# Patient Record
Sex: MALE | Race: OTHER | HISPANIC OR LATINO | URBAN - METROPOLITAN AREA
[De-identification: names, ages, dates, MRNs, and addresses within clinical notes are randomized per-mention and may not be internally consistent; named-entity substitution may affect disease eponyms.]

---

## 2017-02-12 ENCOUNTER — INPATIENT (INPATIENT)
Facility: HOSPITAL | Age: 37
LOS: 2 days | Discharge: HOME | DRG: 418 | End: 2017-02-15
Attending: SURGERY | Admitting: SURGERY
Payer: COMMERCIAL

## 2017-02-12 ENCOUNTER — RESULT REVIEW (OUTPATIENT)
Age: 37
End: 2017-02-12

## 2017-02-12 VITALS
HEART RATE: 69 BPM | TEMPERATURE: 98 F | WEIGHT: 169.98 LBS | SYSTOLIC BLOOD PRESSURE: 132 MMHG | HEIGHT: 67 IN | RESPIRATION RATE: 20 BRPM | DIASTOLIC BLOOD PRESSURE: 87 MMHG | OXYGEN SATURATION: 100 %

## 2017-02-12 DIAGNOSIS — Z90.49 ACQUIRED ABSENCE OF OTHER SPECIFIED PARTS OF DIGESTIVE TRACT: Chronic | ICD-10-CM

## 2017-02-12 LAB
ALBUMIN SERPL ELPH-MCNC: 4.1 G/DL — SIGNIFICANT CHANGE UP (ref 3.3–5.2)
ALP SERPL-CCNC: 78 U/L — SIGNIFICANT CHANGE UP (ref 40–120)
ALT FLD-CCNC: 19 U/L — SIGNIFICANT CHANGE UP
ANION GAP SERPL CALC-SCNC: 11 MMOL/L — SIGNIFICANT CHANGE UP (ref 5–17)
AST SERPL-CCNC: 44 U/L — HIGH
BASOPHILS # BLD AUTO: 0 K/UL — SIGNIFICANT CHANGE UP (ref 0–0.2)
BASOPHILS NFR BLD AUTO: 0.2 % — SIGNIFICANT CHANGE UP (ref 0–2)
BILIRUB SERPL-MCNC: 0.6 MG/DL — SIGNIFICANT CHANGE UP (ref 0.4–2)
BUN SERPL-MCNC: 8 MG/DL — SIGNIFICANT CHANGE UP (ref 8–20)
CALCIUM SERPL-MCNC: 9.3 MG/DL — SIGNIFICANT CHANGE UP (ref 8.6–10.2)
CHLORIDE SERPL-SCNC: 99 MMOL/L — SIGNIFICANT CHANGE UP (ref 98–107)
CO2 SERPL-SCNC: 23 MMOL/L — SIGNIFICANT CHANGE UP (ref 22–29)
CREAT SERPL-MCNC: 0.69 MG/DL — SIGNIFICANT CHANGE UP (ref 0.5–1.3)
EOSINOPHIL # BLD AUTO: 0.1 K/UL — SIGNIFICANT CHANGE UP (ref 0–0.5)
EOSINOPHIL NFR BLD AUTO: 1.2 % — SIGNIFICANT CHANGE UP (ref 0–6)
GLUCOSE SERPL-MCNC: 110 MG/DL — SIGNIFICANT CHANGE UP (ref 70–115)
HCT VFR BLD CALC: 40.5 % — LOW (ref 42–52)
HGB BLD-MCNC: 14.1 G/DL — SIGNIFICANT CHANGE UP (ref 14–18)
LIDOCAIN IGE QN: 20 U/L — LOW (ref 22–51)
LYMPHOCYTES # BLD AUTO: 1.7 K/UL — SIGNIFICANT CHANGE UP (ref 1–4.8)
LYMPHOCYTES # BLD AUTO: 18.5 % — LOW (ref 20–55)
MCHC RBC-ENTMCNC: 31.3 PG — HIGH (ref 27–31)
MCHC RBC-ENTMCNC: 34.8 G/DL — SIGNIFICANT CHANGE UP (ref 32–36)
MCV RBC AUTO: 90 FL — SIGNIFICANT CHANGE UP (ref 80–94)
MONOCYTES # BLD AUTO: 0.5 K/UL — SIGNIFICANT CHANGE UP (ref 0–0.8)
MONOCYTES NFR BLD AUTO: 5.6 % — SIGNIFICANT CHANGE UP (ref 3–10)
NEUTROPHILS # BLD AUTO: 6.7 K/UL — SIGNIFICANT CHANGE UP (ref 1.8–8)
NEUTROPHILS NFR BLD AUTO: 74.3 % — HIGH (ref 37–73)
PLATELET # BLD AUTO: 260 K/UL — SIGNIFICANT CHANGE UP (ref 150–400)
POTASSIUM SERPL-MCNC: 5.1 MMOL/L — SIGNIFICANT CHANGE UP (ref 3.5–5.3)
POTASSIUM SERPL-SCNC: 5.1 MMOL/L — SIGNIFICANT CHANGE UP (ref 3.5–5.3)
PROT SERPL-MCNC: 8.1 G/DL — SIGNIFICANT CHANGE UP (ref 6.6–8.7)
RBC # BLD: 4.5 M/UL — LOW (ref 4.6–6.2)
RBC # FLD: 11.7 % — SIGNIFICANT CHANGE UP (ref 11–15.6)
SODIUM SERPL-SCNC: 133 MMOL/L — LOW (ref 135–145)
WBC # BLD: 9.08 K/UL — SIGNIFICANT CHANGE UP (ref 4.8–10.8)
WBC # FLD AUTO: 9.08 K/UL — SIGNIFICANT CHANGE UP (ref 4.8–10.8)

## 2017-02-12 PROCEDURE — 93010 ELECTROCARDIOGRAM REPORT: CPT

## 2017-02-12 PROCEDURE — 76705 ECHO EXAM OF ABDOMEN: CPT | Mod: 26

## 2017-02-12 RX ORDER — HYDROMORPHONE HYDROCHLORIDE 2 MG/ML
0.5 INJECTION INTRAMUSCULAR; INTRAVENOUS; SUBCUTANEOUS ONCE
Qty: 0 | Refills: 0 | Status: DISCONTINUED | OUTPATIENT
Start: 2017-02-12 | End: 2017-02-12

## 2017-02-12 RX ORDER — SODIUM CHLORIDE 9 MG/ML
1000 INJECTION INTRAMUSCULAR; INTRAVENOUS; SUBCUTANEOUS ONCE
Qty: 0 | Refills: 0 | Status: COMPLETED | OUTPATIENT
Start: 2017-02-12 | End: 2017-02-12

## 2017-02-12 RX ADMIN — HYDROMORPHONE HYDROCHLORIDE 0.5 MILLIGRAM(S): 2 INJECTION INTRAMUSCULAR; INTRAVENOUS; SUBCUTANEOUS at 23:06

## 2017-02-12 RX ADMIN — HYDROMORPHONE HYDROCHLORIDE 0.5 MILLIGRAM(S): 2 INJECTION INTRAMUSCULAR; INTRAVENOUS; SUBCUTANEOUS at 23:16

## 2017-02-12 RX ADMIN — SODIUM CHLORIDE 1000 MILLILITER(S): 9 INJECTION INTRAMUSCULAR; INTRAVENOUS; SUBCUTANEOUS at 21:59

## 2017-02-12 NOTE — ED STATDOCS - MEDICAL DECISION MAKING DETAILS
35 y/o M with 3 day hx of EtOH use, with a day of abdominal pain radiating to the back. Will do labs and CT to rule out pancreatitis.

## 2017-02-12 NOTE — ED ADULT NURSE NOTE - OBJECTIVE STATEMENT
pt received in [facility] track. pt is awake alert oriented following commands and speaking coherently. pt presents to the er complaining of epigastric abdominal pain that radiates to the back since this morning. pt states he was nauseous before but has since resolved. abdomen is soft and non tender. denies nausea vomiting fever chills chest pain sob headache diarrhea constipation and urinary problems. in no apparent distress

## 2017-02-12 NOTE — ED STATDOCS - PROGRESS NOTE DETAILS
Pt appears no toxic, no distress and sitting comfortably with family at bedside. Pt stevie hx of gallstones and states this is the first time he has had this type of abdominal discomfort. Pt drinking IV contrast and awaiting CT scan. Pt with acute kenneth on US and surgery consulted and will come eval patient for admission

## 2017-02-12 NOTE — ED STATDOCS - NS ED MD SCRIBE ATTENDING SCRIBE SECTIONS
PHYSICAL EXAM/PAST MEDICAL/SURGICAL/SOCIAL HISTORY/HIV/HISTORY OF PRESENT ILLNESS/VITAL SIGNS( Pullset)/REVIEW OF SYSTEMS/DISPOSITION

## 2017-02-12 NOTE — ED STATDOCS - ATTENDING CONTRIBUTION TO CARE
I, Nghia Shipley, performed the initial face to face bedside interview with this patient regarding history of present illness, review of symptoms and relevant past medical, social and family history.  I completed an independent physical examination.  I was the initial provider who evaluated this patient. I have signed out the follow up of any pending tests (i.e. labs, radiological studies) to the ACP.  I have communicated the patient’s plan of care and disposition with the ACP.  The history, relevant review of systems, past medical and surgical history, medical decision making, and physical examination was documented by the scribe in my presence and I attest to the accuracy of the documentation.

## 2017-02-13 ENCOUNTER — TRANSCRIPTION ENCOUNTER (OUTPATIENT)
Age: 37
End: 2017-02-13

## 2017-02-13 DIAGNOSIS — K81.0 ACUTE CHOLECYSTITIS: ICD-10-CM

## 2017-02-13 PROCEDURE — 88304 TISSUE EXAM BY PATHOLOGIST: CPT | Mod: 26

## 2017-02-13 PROCEDURE — 99284 EMERGENCY DEPT VISIT MOD MDM: CPT

## 2017-02-13 RX ORDER — CEFOTETAN DISODIUM 1 G
2 VIAL (EA) INJECTION ONCE
Qty: 0 | Refills: 0 | Status: COMPLETED | OUTPATIENT
Start: 2017-02-13 | End: 2017-02-13

## 2017-02-13 RX ORDER — MORPHINE SULFATE 50 MG/1
4 CAPSULE, EXTENDED RELEASE ORAL EVERY 4 HOURS
Qty: 0 | Refills: 0 | Status: DISCONTINUED | OUTPATIENT
Start: 2017-02-13 | End: 2017-02-15

## 2017-02-13 RX ORDER — ONDANSETRON 8 MG/1
4 TABLET, FILM COATED ORAL ONCE
Qty: 0 | Refills: 0 | Status: COMPLETED | OUTPATIENT
Start: 2017-02-13 | End: 2017-02-13

## 2017-02-13 RX ORDER — BENZOCAINE AND MENTHOL 5; 1 G/100ML; G/100ML
1 LIQUID ORAL
Qty: 0 | Refills: 0 | Status: DISCONTINUED | OUTPATIENT
Start: 2017-02-13 | End: 2017-02-15

## 2017-02-13 RX ORDER — POTASSIUM CHLORIDE 20 MEQ
40 PACKET (EA) ORAL ONCE
Qty: 0 | Refills: 0 | Status: COMPLETED | OUTPATIENT
Start: 2017-02-13 | End: 2017-02-13

## 2017-02-13 RX ORDER — PANTOPRAZOLE SODIUM 20 MG/1
40 TABLET, DELAYED RELEASE ORAL DAILY
Qty: 0 | Refills: 0 | Status: DISCONTINUED | OUTPATIENT
Start: 2017-02-13 | End: 2017-02-13

## 2017-02-13 RX ORDER — ENOXAPARIN SODIUM 100 MG/ML
40 INJECTION SUBCUTANEOUS EVERY 24 HOURS
Qty: 0 | Refills: 0 | Status: DISCONTINUED | OUTPATIENT
Start: 2017-02-13 | End: 2017-02-13

## 2017-02-13 RX ORDER — ONDANSETRON 8 MG/1
4 TABLET, FILM COATED ORAL EVERY 8 HOURS
Qty: 0 | Refills: 0 | Status: DISCONTINUED | OUTPATIENT
Start: 2017-02-13 | End: 2017-02-15

## 2017-02-13 RX ORDER — FENTANYL CITRATE 50 UG/ML
50 INJECTION INTRAVENOUS
Qty: 0 | Refills: 0 | Status: DISCONTINUED | OUTPATIENT
Start: 2017-02-13 | End: 2017-02-13

## 2017-02-13 RX ORDER — ENOXAPARIN SODIUM 100 MG/ML
40 INJECTION SUBCUTANEOUS EVERY 24 HOURS
Qty: 0 | Refills: 0 | Status: DISCONTINUED | OUTPATIENT
Start: 2017-02-13 | End: 2017-02-15

## 2017-02-13 RX ORDER — SODIUM CHLORIDE 9 MG/ML
1000 INJECTION, SOLUTION INTRAVENOUS
Qty: 0 | Refills: 0 | Status: DISCONTINUED | OUTPATIENT
Start: 2017-02-13 | End: 2017-02-13

## 2017-02-13 RX ORDER — CEFOTETAN DISODIUM 1 G
2 VIAL (EA) INJECTION EVERY 12 HOURS
Qty: 0 | Refills: 0 | Status: COMPLETED | OUTPATIENT
Start: 2017-02-13 | End: 2017-02-14

## 2017-02-13 RX ORDER — MORPHINE SULFATE 50 MG/1
4 CAPSULE, EXTENDED RELEASE ORAL EVERY 4 HOURS
Qty: 0 | Refills: 0 | Status: DISCONTINUED | OUTPATIENT
Start: 2017-02-13 | End: 2017-02-13

## 2017-02-13 RX ORDER — PANTOPRAZOLE SODIUM 20 MG/1
40 TABLET, DELAYED RELEASE ORAL DAILY
Qty: 0 | Refills: 0 | Status: DISCONTINUED | OUTPATIENT
Start: 2017-02-13 | End: 2017-02-15

## 2017-02-13 RX ORDER — SODIUM CHLORIDE 9 MG/ML
1000 INJECTION, SOLUTION INTRAVENOUS
Qty: 0 | Refills: 0 | Status: DISCONTINUED | OUTPATIENT
Start: 2017-02-13 | End: 2017-02-14

## 2017-02-13 RX ORDER — ONDANSETRON 8 MG/1
4 TABLET, FILM COATED ORAL EVERY 6 HOURS
Qty: 0 | Refills: 0 | Status: DISCONTINUED | OUTPATIENT
Start: 2017-02-13 | End: 2017-02-13

## 2017-02-13 RX ADMIN — MORPHINE SULFATE 4 MILLIGRAM(S): 50 CAPSULE, EXTENDED RELEASE ORAL at 15:35

## 2017-02-13 RX ADMIN — MORPHINE SULFATE 4 MILLIGRAM(S): 50 CAPSULE, EXTENDED RELEASE ORAL at 01:56

## 2017-02-13 RX ADMIN — FENTANYL CITRATE 50 MICROGRAM(S): 50 INJECTION INTRAVENOUS at 13:48

## 2017-02-13 RX ADMIN — PANTOPRAZOLE SODIUM 40 MILLIGRAM(S): 20 TABLET, DELAYED RELEASE ORAL at 01:41

## 2017-02-13 RX ADMIN — MORPHINE SULFATE 4 MILLIGRAM(S): 50 CAPSULE, EXTENDED RELEASE ORAL at 14:48

## 2017-02-13 RX ADMIN — FENTANYL CITRATE 50 MICROGRAM(S): 50 INJECTION INTRAVENOUS at 13:05

## 2017-02-13 RX ADMIN — FENTANYL CITRATE 50 MICROGRAM(S): 50 INJECTION INTRAVENOUS at 13:37

## 2017-02-13 RX ADMIN — ONDANSETRON 4 MILLIGRAM(S): 8 TABLET, FILM COATED ORAL at 14:07

## 2017-02-13 RX ADMIN — FENTANYL CITRATE 50 MICROGRAM(S): 50 INJECTION INTRAVENOUS at 12:55

## 2017-02-13 RX ADMIN — Medication 100 GRAM(S): at 17:27

## 2017-02-13 RX ADMIN — FENTANYL CITRATE 50 MICROGRAM(S): 50 INJECTION INTRAVENOUS at 13:00

## 2017-02-13 RX ADMIN — SODIUM CHLORIDE 125 MILLILITER(S): 9 INJECTION, SOLUTION INTRAVENOUS at 01:42

## 2017-02-13 RX ADMIN — MORPHINE SULFATE 4 MILLIGRAM(S): 50 CAPSULE, EXTENDED RELEASE ORAL at 01:41

## 2017-02-13 RX ADMIN — Medication 100 GRAM(S): at 02:19

## 2017-02-13 RX ADMIN — Medication 40 MILLIEQUIVALENT(S): at 22:11

## 2017-02-13 RX ADMIN — FENTANYL CITRATE 50 MICROGRAM(S): 50 INJECTION INTRAVENOUS at 13:51

## 2017-02-13 RX ADMIN — BENZOCAINE AND MENTHOL 1 LOZENGE: 5; 1 LIQUID ORAL at 22:06

## 2017-02-13 RX ADMIN — FENTANYL CITRATE 50 MICROGRAM(S): 50 INJECTION INTRAVENOUS at 14:00

## 2017-02-13 RX ADMIN — PANTOPRAZOLE SODIUM 40 MILLIGRAM(S): 20 TABLET, DELAYED RELEASE ORAL at 14:30

## 2017-02-13 RX ADMIN — ENOXAPARIN SODIUM 40 MILLIGRAM(S): 100 INJECTION SUBCUTANEOUS at 17:28

## 2017-02-13 NOTE — DISCHARGE NOTE ADULT - CARE PROVIDER_API CALL
Dave Shirley (MD), Surgery  [address]  [place] [address]  Phone: [phone]  Fax: [phone]

## 2017-02-13 NOTE — H&P ADULT. - HISTORY OF PRESENT ILLNESS
35 y/o M with presents to ED c/o epigastric pain that radiates to his back since this morning. He states this his first episode of such pain  Pt reports he has had a few beers over the past few days.   Pt denies fever, diarrhea, nausea, and vomiting.

## 2017-02-13 NOTE — PROGRESS NOTE ADULT - SUBJECTIVE AND OBJECTIVE BOX
INTERVAL HPI/OVERNIGHT EVENTS:    STATUS POST:  laparoscopic cholecystectomy     POST OPERATIVE DAY #: 0     SUBJECTIVE:  Pt seen and examined as a POC. PT tolerated procedure well. EBL 15cc. Pt complains of pain in the right shoulder and at incision sites. He has voided since the OR. Denies F/C/N/V.       MEDICATIONS  (STANDING):  enoxaparin Injectable 40milliGRAM(s) SubCutaneous every 24 hours  lactated ringers. 1000milliLiter(s) IV Continuous <Continuous>  pantoprazole  Injectable 40milliGRAM(s) IV Push daily  cefoTEtan  IVPB 2Gram(s) IV Intermittent every 12 hours  potassium chloride   Solution 40milliEquivalent(s) Oral once    MEDICATIONS  (PRN):  ondansetron Injectable 4milliGRAM(s) IV Push every 8 hours PRN Nausea and/or Vomiting  morphine  - Injectable 4milliGRAM(s) IV Push every 4 hours PRN Severe Pain (7 - 10)  oxyCODONE  5 mG/acetaminophen 325 mG 2Tablet(s) Oral every 4 hours PRN Moderate Pain (4 - 6)      Vital Signs Last 24 Hrs  T(C): 36.8, Max: 37.5 (02-13 @ 02:24)  T(F): 98.2, Max: 99.5 (02-13 @ 02:24)  HR: 72 (61 - 93)  BP: 136/80 (119/69 - 157/90)  BP(mean): --  RR: 16 (12 - 20)  SpO2: 96% (95% - 100%)    PHYSICAL EXAM:      Constitutional: A&Ox3, NAD    Eyes: EOMI     Cardiovascular: RRR    Gastrointestinal: soft, ND, TTP at incision sites. Incision well approximated clean dry and intact. Mild bloody discharge of umbilicus. No erythema         I&O's Detail    I & Os for current day (as of 13 Feb 2017 19:05)  =============================================  IN:    lactated ringers.: 500 ml    Total IN: 500 ml  ---------------------------------------------  OUT:    Total OUT: 0 ml  ---------------------------------------------  Total NET: 500 ml      LABS:                        12.4   8.03  )-----------( 219      ( 13 Feb 2017 05:28 )             36.8     13 Feb 2017 05:28    141    |  104    |  7.0    ----------------------------<  109    3.5     |  26.0   |  0.74     Ca    8.5        13 Feb 2017 05:28    TPro  6.8    /  Alb  3.8    /  TBili  1.0    /  DBili  x      /  AST  130    /  ALT  66     /  AlkPhos  89     13 Feb 2017 05:28    PT/INR - ( 13 Feb 2017 05:28 )   PT: 11.9 sec;   INR: 1.08 ratio         PTT - ( 13 Feb 2017 05:28 )  PTT:29.8 sec      RADIOLOGY & ADDITIONAL STUDIES:

## 2017-02-13 NOTE — H&P ADULT. - ASSESSMENT
35 yo M with acute cholecystitis  - Admit to acute care surgery: Dr. Shirley  - NPO/IVF  - IV abx: cefotetan stat then 2gm q8h  - stat coags and type and scree, UA  - am labs  - pain control: morphine prn  - N/V: zofran prn  - preop for OR later today  - DVT px:lovenox, scd's

## 2017-02-13 NOTE — DISCHARGE NOTE ADULT - CARE PLAN
Principal Discharge DX:	Acute cholecystitis  Goal:	Alleviation of pain and symptoms  Instructions for follow-up, activity and diet:	Follow up: Please call and make an appointment with Dr. Shirley 10-14 days after discharge. Also, please call and make an appointment with your primary care physician as per your usual schedule.   Activity: Please, limit activity and rest until follow up appointment. Refrain from heavy lifting > 10-15 lbs.   Diet: Avoid fatty / fried foods for the next 3-4 weeks.  Medications: Please take all home medications as prescribed by your primary care doctor. Pain medication has been prescribed for you. Please, take it as it has been prescribed, do not drive or operate heavy machinery while taking narcotics.   Wound Care: Please, keep wound site clean and dry. You may shower with steri-strips in place, but do not bathe. Steri-strips will fall off on their own.  If confusion, altered mental status, fever, chest pain, shortness of breath, new or worsening abdominal pain, vomiting, change or worsening of medical status, please come back to the emergency room, and in case of emergency call 911.

## 2017-02-13 NOTE — DISCHARGE NOTE ADULT - MEDICATION SUMMARY - MEDICATIONS TO TAKE
I will START or STAY ON the medications listed below when I get home from the hospital:  None I will START or STAY ON the medications listed below when I get home from the hospital:    acetaminophen-oxyCODONE 325 mg-5 mg oral tablet  -- 1 tab(s) by mouth every 4 hours, As Needed, Moderate Pain (4 - 6) MDD:6 tabs  -- Indication: For moderate to severe pain

## 2017-02-13 NOTE — DISCHARGE NOTE ADULT - HOSPITAL COURSE
37 y/o M with presents to ED c/o epigastric pain that radiates to his back since this morning. He states this his first episode of such pain. Pt reports he has had a few beers over the past few days. Pt denies fever, diarrhea, nausea, and vomiting.     Hospital Course: Gallbladder ultrasound on admission showed acute cholecystitis. Pt was taken to the OR on 2/13 for a laparoscopic cholecystectomy. Intraoperative findings were as follows - inflamed and edematous gallbladder, cholelithiasis, & hydrops of the gallbladder.  Procedure was completed without complication and pt was transferred to the PACU in stable condition. Patient was kept on IV abx for 24 hours after procedure. Post-operatively, pt is hemodynamically well, tolerating a regular diet, pain well controlled on oral medications, OOB ambulating, voiding, and is meeting objective criteria to be discharged home at this time with outpatient follow-up as outlined above.    Patient is advised to RETURN TO THE ED for any of the following - worsening abdominal pain, fever, vomiting, altered mental status, chest pain, shortness of breath, or any other new / worsening symptom.

## 2017-02-13 NOTE — DISCHARGE NOTE ADULT - PATIENT PORTAL LINK FT
“You can access the [employer] Patient Portal, offered by [facility], by registering with the following website: [email or web address]”

## 2017-02-13 NOTE — DISCHARGE NOTE ADULT - PLAN OF CARE
Alleviation of pain and symptoms Follow up: Please call and make an appointment with Dr. Shirley 10-14 days after discharge. Also, please call and make an appointment with your primary care physician as per your usual schedule.   Activity: Please, limit activity and rest until follow up appointment. Refrain from heavy lifting > 10-15 lbs.   Diet: Avoid fatty / fried foods for the next 3-4 weeks.  Medications: Please take all home medications as prescribed by your primary care doctor. Pain medication has been prescribed for you. Please, take it as it has been prescribed, do not drive or operate heavy machinery while taking narcotics.   Wound Care: Please, keep wound site clean and dry. You may shower with steri-strips in place, but do not bathe. Steri-strips will fall off on their own.  If confusion, altered mental status, fever, chest pain, shortness of breath, new or worsening abdominal pain, vomiting, change or worsening of medical status, please come back to the emergency room, and in case of emergency call 911.

## 2017-02-13 NOTE — ED ADULT NURSE REASSESSMENT NOTE - NS ED NURSE REASSESS COMMENT FT1
Pt refusing to change into gown until upstairs, receiving RN made aware, resp even and unlabored, color good, pt states relief of nausea and stating "abd pain just came back after getting that medicine", receiving RN made aware, offers no other complaints, pt placed in wheelchair for transport

## 2017-02-14 PROCEDURE — 85610 PROTHROMBIN TIME: CPT

## 2017-02-14 PROCEDURE — 85730 THROMBOPLASTIN TIME PARTIAL: CPT

## 2017-02-14 PROCEDURE — 85027 COMPLETE CBC AUTOMATED: CPT

## 2017-02-14 PROCEDURE — 76705 ECHO EXAM OF ABDOMEN: CPT

## 2017-02-14 PROCEDURE — 93005 ELECTROCARDIOGRAM TRACING: CPT

## 2017-02-14 PROCEDURE — 86901 BLOOD TYPING SEROLOGIC RH(D): CPT

## 2017-02-14 PROCEDURE — 86900 BLOOD TYPING SEROLOGIC ABO: CPT

## 2017-02-14 PROCEDURE — 96374 THER/PROPH/DIAG INJ IV PUSH: CPT

## 2017-02-14 PROCEDURE — 88304 TISSUE EXAM BY PATHOLOGIST: CPT

## 2017-02-14 PROCEDURE — 96375 TX/PRO/DX INJ NEW DRUG ADDON: CPT

## 2017-02-14 PROCEDURE — 99285 EMERGENCY DEPT VISIT HI MDM: CPT | Mod: 25

## 2017-02-14 PROCEDURE — 36415 COLL VENOUS BLD VENIPUNCTURE: CPT

## 2017-02-14 PROCEDURE — 86850 RBC ANTIBODY SCREEN: CPT

## 2017-02-14 PROCEDURE — 83690 ASSAY OF LIPASE: CPT

## 2017-02-14 PROCEDURE — 80053 COMPREHEN METABOLIC PANEL: CPT

## 2017-02-14 RX ORDER — HYDROMORPHONE HYDROCHLORIDE 2 MG/ML
1 INJECTION INTRAMUSCULAR; INTRAVENOUS; SUBCUTANEOUS ONCE
Qty: 0 | Refills: 0 | Status: DISCONTINUED | OUTPATIENT
Start: 2017-02-14 | End: 2017-02-14

## 2017-02-14 RX ADMIN — Medication 100 GRAM(S): at 06:08

## 2017-02-14 RX ADMIN — HYDROMORPHONE HYDROCHLORIDE 1 MILLIGRAM(S): 2 INJECTION INTRAMUSCULAR; INTRAVENOUS; SUBCUTANEOUS at 12:23

## 2017-02-14 RX ADMIN — PANTOPRAZOLE SODIUM 40 MILLIGRAM(S): 20 TABLET, DELAYED RELEASE ORAL at 11:45

## 2017-02-14 RX ADMIN — ONDANSETRON 4 MILLIGRAM(S): 8 TABLET, FILM COATED ORAL at 07:38

## 2017-02-14 RX ADMIN — HYDROMORPHONE HYDROCHLORIDE 1 MILLIGRAM(S): 2 INJECTION INTRAMUSCULAR; INTRAVENOUS; SUBCUTANEOUS at 12:07

## 2017-02-14 RX ADMIN — ENOXAPARIN SODIUM 40 MILLIGRAM(S): 100 INJECTION SUBCUTANEOUS at 17:36

## 2017-02-14 NOTE — PROGRESS NOTE ADULT - SUBJECTIVE AND OBJECTIVE BOX
pt pod 1 sp lap Fabiana under GETA. Tolerated well. Denies any A/c.   pt with no n/v @ this time. using pain meds as ordered/needed with some pain relief. vss. spont vent. no issues with anesthesia at this time. pt resting comfortably @ this time.

## 2017-02-15 VITALS
RESPIRATION RATE: 18 BRPM | TEMPERATURE: 98 F | OXYGEN SATURATION: 96 % | HEART RATE: 86 BPM | SYSTOLIC BLOOD PRESSURE: 135 MMHG | DIASTOLIC BLOOD PRESSURE: 78 MMHG

## 2017-02-15 RX ORDER — OXYCODONE HYDROCHLORIDE 5 MG/1
1 TABLET ORAL
Qty: 18 | Refills: 0 | OUTPATIENT
Start: 2017-02-15 | End: 2017-02-18

## 2017-02-16 LAB — SURGICAL PATHOLOGY FINAL REPORT - CH: SIGNIFICANT CHANGE UP

## 2017-02-20 ENCOUNTER — EMERGENCY (EMERGENCY)
Facility: HOSPITAL | Age: 37
LOS: 1 days | Discharge: HOME | End: 2017-02-20
Attending: EMERGENCY MEDICINE
Payer: COMMERCIAL

## 2017-02-20 VITALS
DIASTOLIC BLOOD PRESSURE: 93 MMHG | WEIGHT: 175.05 LBS | RESPIRATION RATE: 16 BRPM | HEART RATE: 64 BPM | HEIGHT: 70 IN | TEMPERATURE: 98 F | SYSTOLIC BLOOD PRESSURE: 156 MMHG | OXYGEN SATURATION: 100 %

## 2017-02-20 VITALS
TEMPERATURE: 99 F | HEART RATE: 77 BPM | RESPIRATION RATE: 16 BRPM | SYSTOLIC BLOOD PRESSURE: 133 MMHG | OXYGEN SATURATION: 100 % | DIASTOLIC BLOOD PRESSURE: 71 MMHG

## 2017-02-20 DIAGNOSIS — R10.11 RIGHT UPPER QUADRANT PAIN: ICD-10-CM

## 2017-02-20 DIAGNOSIS — M54.6 PAIN IN THORACIC SPINE: ICD-10-CM

## 2017-02-20 DIAGNOSIS — Z90.49 ACQUIRED ABSENCE OF OTHER SPECIFIED PARTS OF DIGESTIVE TRACT: Chronic | ICD-10-CM

## 2017-02-20 DIAGNOSIS — R10.13 EPIGASTRIC PAIN: ICD-10-CM

## 2017-02-20 DIAGNOSIS — K81.0 ACUTE CHOLECYSTITIS: ICD-10-CM

## 2017-02-20 DIAGNOSIS — Z90.49 ACQUIRED ABSENCE OF OTHER SPECIFIED PARTS OF DIGESTIVE TRACT: ICD-10-CM

## 2017-02-20 LAB
ALBUMIN SERPL ELPH-MCNC: 4.1 G/DL — SIGNIFICANT CHANGE UP (ref 3.3–5.2)
ALP SERPL-CCNC: 92 U/L — SIGNIFICANT CHANGE UP (ref 40–120)
ALT FLD-CCNC: 29 U/L — SIGNIFICANT CHANGE UP
ANION GAP SERPL CALC-SCNC: 12 MMOL/L — SIGNIFICANT CHANGE UP (ref 5–17)
ANISOCYTOSIS BLD QL: SLIGHT — SIGNIFICANT CHANGE UP
APPEARANCE UR: ABNORMAL
APTT BLD: 22.6 SEC — LOW (ref 27.5–37.4)
AST SERPL-CCNC: 18 U/L — SIGNIFICANT CHANGE UP
BACTERIA # UR AUTO: ABNORMAL
BILIRUB SERPL-MCNC: 0.5 MG/DL — SIGNIFICANT CHANGE UP (ref 0.4–2)
BILIRUB UR-MCNC: NEGATIVE — SIGNIFICANT CHANGE UP
BUN SERPL-MCNC: 10 MG/DL — SIGNIFICANT CHANGE UP (ref 8–20)
CALCIUM SERPL-MCNC: 9.2 MG/DL — SIGNIFICANT CHANGE UP (ref 8.6–10.2)
CHLORIDE SERPL-SCNC: 101 MMOL/L — SIGNIFICANT CHANGE UP (ref 98–107)
CO2 SERPL-SCNC: 22 MMOL/L — SIGNIFICANT CHANGE UP (ref 22–29)
COLOR SPEC: YELLOW — SIGNIFICANT CHANGE UP
COMMENT - URINE: SIGNIFICANT CHANGE UP
CREAT SERPL-MCNC: 0.68 MG/DL — SIGNIFICANT CHANGE UP (ref 0.5–1.3)
DIFF PNL FLD: ABNORMAL
EOSINOPHIL NFR BLD AUTO: 1 % — SIGNIFICANT CHANGE UP (ref 0–6)
EPI CELLS # UR: SIGNIFICANT CHANGE UP
GLUCOSE SERPL-MCNC: 112 MG/DL — SIGNIFICANT CHANGE UP (ref 70–115)
GLUCOSE UR QL: NEGATIVE MG/DL — SIGNIFICANT CHANGE UP
HCT VFR BLD CALC: 41.1 % — LOW (ref 42–52)
HGB BLD-MCNC: 14 G/DL — SIGNIFICANT CHANGE UP (ref 14–18)
HYPOCHROMIA BLD QL: SLIGHT — SIGNIFICANT CHANGE UP
INR BLD: 1.19 RATIO — HIGH (ref 0.88–1.16)
KETONES UR-MCNC: NEGATIVE — SIGNIFICANT CHANGE UP
LEUKOCYTE ESTERASE UR-ACNC: ABNORMAL
LIDOCAIN IGE QN: 32 U/L — SIGNIFICANT CHANGE UP (ref 22–51)
LYMPHOCYTES # BLD AUTO: 8 % — LOW (ref 20–55)
MACROCYTES BLD QL: SLIGHT — SIGNIFICANT CHANGE UP
MCHC RBC-ENTMCNC: 31 PG — SIGNIFICANT CHANGE UP (ref 27–31)
MCHC RBC-ENTMCNC: 34.1 G/DL — SIGNIFICANT CHANGE UP (ref 32–36)
MCV RBC AUTO: 90.9 FL — SIGNIFICANT CHANGE UP (ref 80–94)
MONOCYTES NFR BLD AUTO: 3 % — SIGNIFICANT CHANGE UP (ref 3–10)
NEUTROPHILS NFR BLD AUTO: 88 % — HIGH (ref 37–73)
NITRITE UR-MCNC: NEGATIVE — SIGNIFICANT CHANGE UP
PH UR: 5 — SIGNIFICANT CHANGE UP (ref 4.8–8)
PLAT MORPH BLD: NORMAL — SIGNIFICANT CHANGE UP
PLATELET # BLD AUTO: 299 K/UL — SIGNIFICANT CHANGE UP (ref 150–400)
POIKILOCYTOSIS BLD QL AUTO: SLIGHT — SIGNIFICANT CHANGE UP
POTASSIUM SERPL-MCNC: 4.1 MMOL/L — SIGNIFICANT CHANGE UP (ref 3.5–5.3)
POTASSIUM SERPL-SCNC: 4.1 MMOL/L — SIGNIFICANT CHANGE UP (ref 3.5–5.3)
PROT SERPL-MCNC: 8.3 G/DL — SIGNIFICANT CHANGE UP (ref 6.6–8.7)
PROT UR-MCNC: 15 MG/DL
PROTHROM AB SERPL-ACNC: 13.1 SEC — SIGNIFICANT CHANGE UP (ref 10–13.1)
RBC # BLD: 4.52 M/UL — LOW (ref 4.6–6.2)
RBC # FLD: 11.9 % — SIGNIFICANT CHANGE UP (ref 11–15.6)
RBC BLD AUTO: ABNORMAL
RBC CASTS # UR COMP ASSIST: SIGNIFICANT CHANGE UP /HPF (ref 0–4)
SODIUM SERPL-SCNC: 135 MMOL/L — SIGNIFICANT CHANGE UP (ref 135–145)
SP GR SPEC: 1.02 — SIGNIFICANT CHANGE UP (ref 1.01–1.02)
UROBILINOGEN FLD QL: NEGATIVE MG/DL — SIGNIFICANT CHANGE UP
WBC # BLD: 12.9 K/UL — HIGH (ref 4.8–10.8)
WBC # FLD AUTO: 12.9 K/UL — HIGH (ref 4.8–10.8)
WBC UR QL: SIGNIFICANT CHANGE UP

## 2017-02-20 PROCEDURE — 74177 CT ABD & PELVIS W/CONTRAST: CPT

## 2017-02-20 PROCEDURE — 74022 RADEX COMPL AQT ABD SERIES: CPT

## 2017-02-20 PROCEDURE — 74022 RADEX COMPL AQT ABD SERIES: CPT | Mod: 26

## 2017-02-20 PROCEDURE — 96374 THER/PROPH/DIAG INJ IV PUSH: CPT

## 2017-02-20 PROCEDURE — 85027 COMPLETE CBC AUTOMATED: CPT

## 2017-02-20 PROCEDURE — 81001 URINALYSIS AUTO W/SCOPE: CPT

## 2017-02-20 PROCEDURE — 80053 COMPREHEN METABOLIC PANEL: CPT

## 2017-02-20 PROCEDURE — 99284 EMERGENCY DEPT VISIT MOD MDM: CPT | Mod: 25

## 2017-02-20 PROCEDURE — 96376 TX/PRO/DX INJ SAME DRUG ADON: CPT | Mod: XU

## 2017-02-20 PROCEDURE — 85610 PROTHROMBIN TIME: CPT

## 2017-02-20 PROCEDURE — 99284 EMERGENCY DEPT VISIT MOD MDM: CPT

## 2017-02-20 PROCEDURE — 83690 ASSAY OF LIPASE: CPT

## 2017-02-20 PROCEDURE — 85730 THROMBOPLASTIN TIME PARTIAL: CPT

## 2017-02-20 PROCEDURE — 74177 CT ABD & PELVIS W/CONTRAST: CPT | Mod: 26

## 2017-02-20 RX ORDER — HYDROMORPHONE HYDROCHLORIDE 2 MG/ML
1 INJECTION INTRAMUSCULAR; INTRAVENOUS; SUBCUTANEOUS ONCE
Qty: 0 | Refills: 0 | Status: DISCONTINUED | OUTPATIENT
Start: 2017-02-20 | End: 2017-02-20

## 2017-02-20 RX ORDER — MORPHINE SULFATE 50 MG/1
4 CAPSULE, EXTENDED RELEASE ORAL ONCE
Qty: 0 | Refills: 0 | Status: DISCONTINUED | OUTPATIENT
Start: 2017-02-20 | End: 2017-02-20

## 2017-02-20 RX ORDER — HYDROMORPHONE HYDROCHLORIDE 2 MG/ML
0.5 INJECTION INTRAMUSCULAR; INTRAVENOUS; SUBCUTANEOUS ONCE
Qty: 0 | Refills: 0 | Status: DISCONTINUED | OUTPATIENT
Start: 2017-02-20 | End: 2017-02-20

## 2017-02-20 RX ORDER — SODIUM CHLORIDE 9 MG/ML
1000 INJECTION INTRAMUSCULAR; INTRAVENOUS; SUBCUTANEOUS ONCE
Qty: 0 | Refills: 0 | Status: COMPLETED | OUTPATIENT
Start: 2017-02-20 | End: 2017-02-20

## 2017-02-20 RX ADMIN — HYDROMORPHONE HYDROCHLORIDE 0.5 MILLIGRAM(S): 2 INJECTION INTRAMUSCULAR; INTRAVENOUS; SUBCUTANEOUS at 19:33

## 2017-02-20 RX ADMIN — HYDROMORPHONE HYDROCHLORIDE 1 MILLIGRAM(S): 2 INJECTION INTRAMUSCULAR; INTRAVENOUS; SUBCUTANEOUS at 14:00

## 2017-02-20 RX ADMIN — SODIUM CHLORIDE 2000 MILLILITER(S): 9 INJECTION INTRAMUSCULAR; INTRAVENOUS; SUBCUTANEOUS at 14:03

## 2017-02-20 NOTE — CONSULT NOTE ADULT - SUBJECTIVE AND OBJECTIVE BOX
HPI: A 35 yo M POD#7 s/p laparoscopic cholecystectomy presents to ED c/o abdominal pain x 1 day. Pain was severe and localized in RUQ. No fever, chills, nausea or vomiting. No change in diet or bowel habits, however has not had one today. Denies CP, SOB or diaphoresis. No palpitations.       PAST MEDICAL & SURGICAL HISTORY:  Diverticula of intestine  No pertinent past medical history  No pertinent past medical history  History of cholecystectomy  History of appendectomy      REVIEW OF SYSTEMS: Negative except as above.   	    MEDICATIONS  (STANDING):    MEDICATIONS  (PRN):      Allergies    No Known Allergies    Intolerances        SOCIAL HISTORY:    FAMILY HISTORY:  No pertinent family history in first degree relatives      Vital Signs Last 24 Hrs  T(C): 37, Max: 37 ( @ 20:44)  T(F): 98.6, Max: 98.6 ( @ 20:44)  HR: 77 (64 - 93)  BP: 133/71 (133/71 - 156/93)  BP(mean): --  RR: 16 (16 - 20)  SpO2: 100% (100% - 100%)    PHYSICAL EXAM:      Constitutional: AAOx3, NAD    Eyes: EOMI, PERRLA    Respiratory: CTAB, non-labored    Cardiovascular: RRR, s1/s2+    Gastrointestinal: soft and depressible. mild tenderness in RUQ. no rebound or guarding. - Lao;s   :        LABS:                        14.0   12.9  )-----------( 299      ( 2017 12:54 )             41.1     2017 12:54    135    |  101    |  10.0   ----------------------------<  112    4.1     |  22.0   |  0.68     Ca    9.2        2017 12:54    TPro  8.3    /  Alb  4.1    /  TBili  0.5    /  DBili  x      /  AST  18     /  ALT  29     /  AlkPhos  92     2017 12:54    PT/INR - ( 2017 12:54 )   PT: 13.1 sec;   INR: 1.19 ratio         PTT - ( 2017 12:54 )  PTT:22.6 sec  Urinalysis Basic - ( 2017 14:24 )    Color: Yellow / Appearance: very cloudy / S.025 / pH: x  Gluc: x / Ketone: Negative  / Bili: Negative / Urobili: Negative mg/dL   Blood: x / Protein: 15 mg/dL / Nitrite: Negative   Leuk Esterase: Trace / RBC: 0-2 /HPF / WBC 0-2   Sq Epi: x / Non Sq Epi: Occasional / Bacteria: Few        RADIOLOGY & ADDITIONAL STUDIES:  IMPRESSION:    Status post cholecystectomy. There is a small fluid collection in the  gallbladder fossa as well as a small amount of free intraperitoneal fluid in  the upper abdomen and pelvis. In addition there are mesenteric/peritoneal  inflammatory changes in the right lower quadrant and adjacent to the right  lobe of the liver.    Findings are concerning for bile leak. No definite evidence of abscess.

## 2017-02-20 NOTE — ED ADULT NURSE NOTE - OBJECTIVE STATEMENT
Late entry : Pt s/p kenneth last Monday done by Dr. Rodriguez , c/o RUQ abd pain radiating to R shoulder and R flank , pt states was feeling better had no pain post surgery , woke up today went to the bathroom to urinate and started to have pain , tender in the R flank

## 2017-02-20 NOTE — ED PROVIDER NOTE - OBJECTIVE STATEMENT
Pt. present to ED c/o epigastric/RUQ and Right mid back pain that started today. Pt. is s/p Cholecystectomy last week monday. Surgeon was doctor Prichep. Pt. denies any fever. Pain is worst if he lays flat on his back and with deep breath. Pt. denies any chest pain. No SOB. Pt. has had bowel movement since being discharge from the hospital. Pt. did not pass gas today. No vomiting. No urinary complaint. Pt. does feel his abdomen is slightly enlarged. Pt. took percocet for pain.

## 2017-02-20 NOTE — CONSULT NOTE ADULT - ASSESSMENT
A 37 yo M s/p cholecystectomy now w RUQ pain. Afebrile, mild leukocytosis and mild tenderness. No nausea or vomiting. CT consistent w post op changes. No collection.

## 2017-02-20 NOTE — CONSULT NOTE ADULT - PROBLEM SELECTOR RECOMMENDATION 9
resolved, s/p cholecystectomy POD#7  post op changes in CT  d/c home, follow up as outpatient  augmentin PO x 5 days

## 2017-02-20 NOTE — ED ADULT TRIAGE NOTE - CHIEF COMPLAINT QUOTE
pt comes to ed reporting kenneth. 2/13 done here now reporting abd pain radiating to back. denies nausea vomiting. surgeon carrillo

## 2017-02-20 NOTE — ED PROVIDER NOTE - MEDICAL DECISION MAKING DETAILS
Abdominal pain/right upper back pain. Check labs/xray. Hydrate. Treat pain. R/O possible post op complications.

## 2017-02-20 NOTE — ED PROVIDER NOTE - CONSTITUTIONAL, MLM
normal... Well appearing, well nourished, awake, alert, oriented to person, place, time/situation and in no apparent distress. Pt. does appear uncomfortable with some discomfort.

## 2017-02-20 NOTE — ED ADULT NURSE REASSESSMENT NOTE - NS ED NURSE REASSESS COMMENT FT1
Care assumed at this time from off-going RN. Charting as noted. Pt awaiting radiology results at this time. Vitals are as charted.

## 2017-02-20 NOTE — ED PROVIDER NOTE - ABDOMINAL TENDER
right upper quadrant/right costovertebral angle/epigastric/+Guarding and rebound tenderness to epigastric/RUQ region. Surgical  site is clean. No discharge.

## 2017-02-20 NOTE — ED PROVIDER NOTE - PROGRESS NOTE DETAILS
Pt. re-evaluated. Pt. still having abdominal pain. Pt. will need CT scan to r/o abscess or post. infection. Pt. re-evaluated. Pt. still having abdominal pain. Pt. will need CT scan to r/o abscess or post. infection. Pt. also has hx of perforated diverticulitis this month. Pt. re-evaluated. Pt. still having abdominal pain. Pt. will need CT scan to r/o abscess or post. infection. Pt. also has hx of perforated diverticulitis last year. Case discussed with general sx. They will come to see the patient. Pt. re-evaluated. Pt. seen by the surgical team. They state that the patient can be discharged home. Pt. will follow up with his surgeon. Pt. also never picked up his narcotic pain medication. Surgical team also recommend sending pt. home on 5 days of augmentin.

## 2017-02-20 NOTE — ED ADULT NURSE NOTE - CHIEF COMPLAINT QUOTE
pt comes to ed reporting kenneth. 2/13 done here now reporting abd pain radiating to back. denies nausea vomiting. surgeon crarillo

## 2017-08-01 ENCOUNTER — RESULT REVIEW (OUTPATIENT)
Age: 37
End: 2017-08-01

## 2017-09-12 ENCOUNTER — RESULT REVIEW (OUTPATIENT)
Age: 37
End: 2017-09-12

## 2019-02-15 NOTE — H&P ADULT. - NSSUBSTANCEUSE_GEN_ALL_CORE_SD
X Size Of Lesion In Cm: 0 Type Of Destruction Used: Curettage Electrodesiccation And Curettage Text: The wound bed was treated with electrodesiccation and curettage after the biopsy was performed. Biopsy Type: H and E Billing Type: Third-Party Bill Anesthesia Volume In Cc (Will Not Render If 0): 0.5 Dressing: bandage Post-Care Instructions: I reviewed with the patient in detail post-care instructions. Patient is to keep the biopsy site dry overnight, and then apply bacitracin twice daily until healed. Patient may apply hydrogen peroxide soaks to remove any crusting. Silver Nitrate Text: The wound bed was treated with silver nitrate after the biopsy was performed. Body Location Override (Optional - Billing Will Still Be Based On Selected Body Map Location If Applicable): right cheek never used Hemostasis: Aluminum Chloride Was A Bandage Applied: Yes Curettage Text: The wound bed was treated with curettage after the biopsy was performed. Notification Instructions: Patient will be notified of biopsy results. However, patient instructed to call the office if not contacted within 2 weeks. Biopsy Method: Dermablade Detail Level: Detailed Lab: 17 Bill For Surgical Tray: no Consent: Written consent was obtained and risks were reviewed including but not limited to scarring, infection, bleeding, scabbing, incomplete removal, nerve damage and allergy to anesthesia. Cryotherapy Text: The wound bed was treated with cryotherapy after the biopsy was performed. Anesthesia Type: 1% lidocaine with epinephrine Wound Care: Petrolatum Lab Facility: 5 Electrodesiccation Text: The wound bed was treated with electrodesiccation after the biopsy was performed. Depth Of Biopsy: dermis

## 2019-11-05 NOTE — PATIENT PROFILE ADULT. - CAREGIVER
Patient sat in the chair for 2 hours, was turned every 2 hours, redness on the mid back and coccyx. Family at bedside, NPO for procedure. VSS. RA sat 95%.    No

## 2020-08-25 NOTE — PATIENT PROFILE ADULT. - BRADEN SCORE (IF 18 OR LESS ACTIVATE SKIN INJURY RISK INCREASED GUIDELINE), MLM
Patient Education    BRIGHT FUTURES HANDOUT- PARENT  11 THROUGH 14 YEAR VISITS  Here are some suggestions from [facility] experts that may be of value to your family.     HOW YOUR FAMILY IS DOING  Encourage your child to be part of family decisions. Give your child the chance to make more of her own decisions as she grows older.  Encourage your child to think through problems with your support.  Help your child find activities she is really interested in, besides schoolwork.  Help your child find and try activities that help others.  Help your child deal with conflict.  Help your child figure out nonviolent ways to handle anger or fear.  If you are worried about your living or food situation, talk with us. Community agencies and programs such as [employer] can also provide information and assistance.    YOUR GROWING AND CHANGING CHILD  Help your child get to the dentist twice a year.  Give your child a fluoride supplement if the dentist recommends it.  Encourage your child to brush her teeth twice a day and floss once a day.  Praise your child when she does something well, not just when she looks good.  Support a healthy body weight and help your child be a healthy eater.  Provide healthy foods.  Eat together as a family.  Be a role model.  Help your child get enough calcium with low-fat or fat-free milk, low-fat yogurt, and cheese.  Encourage your child to get at least 1 hour of physical activity every day. Make sure she uses helmets and other safety gear.  Consider making a family media use plan. Make rules for media use and balance your child s time for physical activities and other activities.  Check in with your child s teacher about grades. Attend back-to-school events, parent-teacher conferences, and other school activities if possible.  Talk with your child as she takes over responsibility for schoolwork.  Help your child with organizing time, if she needs it.  Encourage daily reading.  YOUR CHILD S  FEELINGS  Find ways to spend time with your child.  If you are concerned that your child is sad, depressed, nervous, irritable, hopeless, or angry, let us know.  Talk with your child about how his body is changing during puberty.  If you have questions about your child s sexual development, you can always talk with us.    HEALTHY BEHAVIOR CHOICES  Help your child find fun, safe things to do.  Make sure your child knows how you feel about alcohol and drug use.  Know your child s friends and their parents. Be aware of where your child is and what he is doing at all times.  Lock your liquor in a cabinet.  Store prescription medications in a locked cabinet.  Talk with your child about relationships, sex, and values.  If you are uncomfortable talking about puberty or sexual pressures with your child, please ask us or others you trust for reliable information that can help.  Use clear and consistent rules and discipline with your child.  Be a role model.    SAFETY  Make sure everyone always wears a lap and shoulder seat belt in the car.  Provide a properly fitting helmet and safety gear for biking, skating, in-line skating, skiing, snowmobiling, and horseback riding.  Use a hat, sun protection clothing, and sunscreen with SPF of 15 or higher on her exposed skin. Limit time outside when the sun is strongest (11:00 am-3:00 pm).  Don t allow your child to ride ATVs.  Make sure your child knows how to get help if she feels unsafe.  If it is necessary to keep a gun in your home, store it unloaded and locked with the ammunition locked separately from the gun.          Helpful Resources:  Family Media Use Plan: www.healthychildren.org/MediaUsePlan   Consistent with Bright Futures: Guidelines for Health Supervision of Infants, Children, and Adolescents, 4th Edition  For more information, go to https://brightfutures.aap.org.            22

## 2021-11-10 ENCOUNTER — EMERGENCY (EMERGENCY)
Facility: HOSPITAL | Age: 41
LOS: 1 days | Discharge: HOME | End: 2021-11-10
Attending: EMERGENCY MEDICINE
Payer: COMMERCIAL

## 2021-11-10 VITALS
OXYGEN SATURATION: 97 % | DIASTOLIC BLOOD PRESSURE: 84 MMHG | WEIGHT: 160.06 LBS | TEMPERATURE: 99 F | RESPIRATION RATE: 18 BRPM | SYSTOLIC BLOOD PRESSURE: 134 MMHG | HEART RATE: 92 BPM | HEIGHT: 67 IN

## 2021-11-10 DIAGNOSIS — Z90.49 ACQUIRED ABSENCE OF OTHER SPECIFIED PARTS OF DIGESTIVE TRACT: Chronic | ICD-10-CM

## 2021-11-10 PROBLEM — K57.30 DIVERTICULOSIS OF LARGE INTESTINE WITHOUT PERFORATION OR ABSCESS WITHOUT BLEEDING: Chronic | Status: ACTIVE | Noted: 2017-02-13

## 2021-11-10 LAB
ALBUMIN SERPL ELPH-MCNC: 4.7 G/DL — SIGNIFICANT CHANGE UP (ref 3.3–5.2)
ALP SERPL-CCNC: 85 U/L — SIGNIFICANT CHANGE UP (ref 40–120)
ALT FLD-CCNC: 15 U/L — SIGNIFICANT CHANGE UP
ANION GAP SERPL CALC-SCNC: 14 MMOL/L — SIGNIFICANT CHANGE UP (ref 5–17)
APPEARANCE UR: CLEAR — SIGNIFICANT CHANGE UP
AST SERPL-CCNC: 18 U/L — SIGNIFICANT CHANGE UP
BACTERIA # UR AUTO: ABNORMAL
BASOPHILS # BLD AUTO: 0.02 K/UL — SIGNIFICANT CHANGE UP (ref 0–0.2)
BASOPHILS NFR BLD AUTO: 0.3 % — SIGNIFICANT CHANGE UP (ref 0–2)
BILIRUB SERPL-MCNC: 1.1 MG/DL — SIGNIFICANT CHANGE UP (ref 0.4–2)
BILIRUB UR-MCNC: NEGATIVE — SIGNIFICANT CHANGE UP
BUN SERPL-MCNC: 6.2 MG/DL — LOW (ref 8–20)
CALCIUM SERPL-MCNC: 9.1 MG/DL — SIGNIFICANT CHANGE UP (ref 8.6–10.2)
CHLORIDE SERPL-SCNC: 100 MMOL/L — SIGNIFICANT CHANGE UP (ref 98–107)
CO2 SERPL-SCNC: 23 MMOL/L — SIGNIFICANT CHANGE UP (ref 22–29)
COLOR SPEC: YELLOW — SIGNIFICANT CHANGE UP
CREAT SERPL-MCNC: 0.68 MG/DL — SIGNIFICANT CHANGE UP (ref 0.5–1.3)
DIFF PNL FLD: ABNORMAL
EOSINOPHIL # BLD AUTO: 0.01 K/UL — SIGNIFICANT CHANGE UP (ref 0–0.5)
EOSINOPHIL NFR BLD AUTO: 0.2 % — SIGNIFICANT CHANGE UP (ref 0–6)
EPI CELLS # UR: SIGNIFICANT CHANGE UP
GLUCOSE SERPL-MCNC: 95 MG/DL — SIGNIFICANT CHANGE UP (ref 70–99)
GLUCOSE UR QL: NEGATIVE MG/DL — SIGNIFICANT CHANGE UP
HCT VFR BLD CALC: 44.7 % — SIGNIFICANT CHANGE UP (ref 39–50)
HGB BLD-MCNC: 15.5 G/DL — SIGNIFICANT CHANGE UP (ref 13–17)
IMM GRANULOCYTES NFR BLD AUTO: 0.3 % — SIGNIFICANT CHANGE UP (ref 0–1.5)
KETONES UR-MCNC: ABNORMAL
LEUKOCYTE ESTERASE UR-ACNC: NEGATIVE — SIGNIFICANT CHANGE UP
LIDOCAIN IGE QN: 11 U/L — LOW (ref 22–51)
LYMPHOCYTES # BLD AUTO: 1.5 K/UL — SIGNIFICANT CHANGE UP (ref 1–3.3)
LYMPHOCYTES # BLD AUTO: 24.6 % — SIGNIFICANT CHANGE UP (ref 13–44)
MCHC RBC-ENTMCNC: 31.6 PG — SIGNIFICANT CHANGE UP (ref 27–34)
MCHC RBC-ENTMCNC: 34.7 GM/DL — SIGNIFICANT CHANGE UP (ref 32–36)
MCV RBC AUTO: 91.2 FL — SIGNIFICANT CHANGE UP (ref 80–100)
MONOCYTES # BLD AUTO: 0.29 K/UL — SIGNIFICANT CHANGE UP (ref 0–0.9)
MONOCYTES NFR BLD AUTO: 4.8 % — SIGNIFICANT CHANGE UP (ref 2–14)
NEUTROPHILS # BLD AUTO: 4.25 K/UL — SIGNIFICANT CHANGE UP (ref 1.8–7.4)
NEUTROPHILS NFR BLD AUTO: 69.8 % — SIGNIFICANT CHANGE UP (ref 43–77)
NITRITE UR-MCNC: NEGATIVE — SIGNIFICANT CHANGE UP
PH UR: 6 — SIGNIFICANT CHANGE UP (ref 5–8)
PLATELET # BLD AUTO: 245 K/UL — SIGNIFICANT CHANGE UP (ref 150–400)
POTASSIUM SERPL-MCNC: 3.5 MMOL/L — SIGNIFICANT CHANGE UP (ref 3.5–5.3)
POTASSIUM SERPL-SCNC: 3.5 MMOL/L — SIGNIFICANT CHANGE UP (ref 3.5–5.3)
PROT SERPL-MCNC: 8.1 G/DL — SIGNIFICANT CHANGE UP (ref 6.6–8.7)
PROT UR-MCNC: NEGATIVE MG/DL — SIGNIFICANT CHANGE UP
RBC # BLD: 4.9 M/UL — SIGNIFICANT CHANGE UP (ref 4.2–5.8)
RBC # FLD: 11.3 % — SIGNIFICANT CHANGE UP (ref 10.3–14.5)
RBC CASTS # UR COMP ASSIST: SIGNIFICANT CHANGE UP /HPF (ref 0–4)
SODIUM SERPL-SCNC: 137 MMOL/L — SIGNIFICANT CHANGE UP (ref 135–145)
SP GR SPEC: 1.01 — SIGNIFICANT CHANGE UP (ref 1.01–1.02)
TROPONIN T SERPL-MCNC: <0.01 NG/ML — SIGNIFICANT CHANGE UP (ref 0–0.06)
UROBILINOGEN FLD QL: NEGATIVE MG/DL — SIGNIFICANT CHANGE UP
WBC # BLD: 6.09 K/UL — SIGNIFICANT CHANGE UP (ref 3.8–10.5)
WBC # FLD AUTO: 6.09 K/UL — SIGNIFICANT CHANGE UP (ref 3.8–10.5)
WBC UR QL: SIGNIFICANT CHANGE UP

## 2021-11-10 PROCEDURE — 99285 EMERGENCY DEPT VISIT HI MDM: CPT

## 2021-11-10 PROCEDURE — 84484 ASSAY OF TROPONIN QUANT: CPT

## 2021-11-10 PROCEDURE — 83690 ASSAY OF LIPASE: CPT

## 2021-11-10 PROCEDURE — 81001 URINALYSIS AUTO W/SCOPE: CPT

## 2021-11-10 PROCEDURE — 36415 COLL VENOUS BLD VENIPUNCTURE: CPT

## 2021-11-10 PROCEDURE — 74177 CT ABD & PELVIS W/CONTRAST: CPT | Mod: MA

## 2021-11-10 PROCEDURE — 80053 COMPREHEN METABOLIC PANEL: CPT

## 2021-11-10 PROCEDURE — 96375 TX/PRO/DX INJ NEW DRUG ADDON: CPT

## 2021-11-10 PROCEDURE — 93010 ELECTROCARDIOGRAM REPORT: CPT

## 2021-11-10 PROCEDURE — 99284 EMERGENCY DEPT VISIT MOD MDM: CPT | Mod: 25

## 2021-11-10 PROCEDURE — 93005 ELECTROCARDIOGRAM TRACING: CPT

## 2021-11-10 PROCEDURE — 85025 COMPLETE CBC W/AUTO DIFF WBC: CPT

## 2021-11-10 PROCEDURE — 74177 CT ABD & PELVIS W/CONTRAST: CPT | Mod: 26,MA

## 2021-11-10 PROCEDURE — 96374 THER/PROPH/DIAG INJ IV PUSH: CPT | Mod: XU

## 2021-11-10 RX ORDER — PANTOPRAZOLE SODIUM 20 MG/1
40 TABLET, DELAYED RELEASE ORAL ONCE
Refills: 0 | Status: COMPLETED | OUTPATIENT
Start: 2021-11-10 | End: 2021-11-10

## 2021-11-10 RX ORDER — AZITHROMYCIN 500 MG/1
1 TABLET, FILM COATED ORAL
Qty: 1 | Refills: 0
Start: 2021-11-10 | End: 2021-11-14

## 2021-11-10 RX ORDER — SODIUM CHLORIDE 9 MG/ML
1000 INJECTION INTRAMUSCULAR; INTRAVENOUS; SUBCUTANEOUS ONCE
Refills: 0 | Status: COMPLETED | OUTPATIENT
Start: 2021-11-10 | End: 2021-11-10

## 2021-11-10 RX ORDER — IOHEXOL 300 MG/ML
30 INJECTION, SOLUTION INTRAVENOUS ONCE
Refills: 0 | Status: COMPLETED | OUTPATIENT
Start: 2021-11-10 | End: 2021-11-10

## 2021-11-10 RX ORDER — KETOROLAC TROMETHAMINE 30 MG/ML
30 SYRINGE (ML) INJECTION ONCE
Refills: 0 | Status: DISCONTINUED | OUTPATIENT
Start: 2021-11-10 | End: 2021-11-10

## 2021-11-10 RX ORDER — PANTOPRAZOLE SODIUM 20 MG/1
1 TABLET, DELAYED RELEASE ORAL
Qty: 14 | Refills: 0
Start: 2021-11-10 | End: 2021-11-23

## 2021-11-10 RX ADMIN — PANTOPRAZOLE SODIUM 40 MILLIGRAM(S): 20 TABLET, DELAYED RELEASE ORAL at 16:28

## 2021-11-10 RX ADMIN — IOHEXOL 30 MILLILITER(S): 300 INJECTION, SOLUTION INTRAVENOUS at 16:27

## 2021-11-10 RX ADMIN — SODIUM CHLORIDE 1000 MILLILITER(S): 9 INJECTION INTRAMUSCULAR; INTRAVENOUS; SUBCUTANEOUS at 16:28

## 2021-11-10 RX ADMIN — Medication 30 MILLIGRAM(S): at 16:27

## 2021-11-10 NOTE — ED STATDOCS - PHYSICAL EXAMINATION
Const: Awake, alert and oriented. In no acute distress. Well appearing.  HEENT: NC/AT. Moist mucous membranes.  Eyes: No scleral icterus. EOMI.  Neck:. Soft and supple. Full ROM without pain.  Cardiac: Regular rate and regular rhythm. +S1/S2. No murmurs. Peripheral pulses 2+ and symmetric. No LE edema.  Resp: Speaking in full sentences. No evidence of respiratory distress. No wheezes, rales or rhonchi.  Abd: Soft, + epigastric and RUQ TTP, non-distended. Normal bowel sounds in all 4 quadrants. No guarding or rebound.  Back: Spine midline and non-tender. right CVAT.  Skin: No rashes, abrasions or lacerations.  Neuro: Awake, alert & oriented x 3. Moves all extremities symmetrically.

## 2021-11-10 NOTE — ED ADULT TRIAGE NOTE - CHIEF COMPLAINT QUOTE
Patient states that he has been having epigastric pain since Saturday. Pt states that he is having some nausea but denies any vomiting. Pt states that he had an episode of diarrhea yesterday but it has subsided. Pt denies any fevers or urinary symptoms.

## 2021-11-10 NOTE — ED STATDOCS - NS ED ROS FT
Const: Denies fever, chills  HEENT: Denies blurry vision, sore throat  Neck: Denies neck pain/stiffness  Resp: Denies coughing, SOB  Cardiovascular: Denies CP, palpitations, LE edema  GI: + nausea, + abdominal pain, + diarrhea. Denies vomiting, constipation, blood in stool  : Denies urinary frequency/urgency/dysuria, hematuria  MSK: Denies back pain  Neuro: Denies HA, dizziness, numbness, weakness  Skin: Denies rashes.

## 2021-11-10 NOTE — ED STATDOCS - ATTENDING CONTRIBUTION TO CARE
I, Nilsa Abdul, performed the initial face to face bedside interview with this patient regarding history of present illness, review of symptoms and relevant past medical, social and family history.  I completed an independent physical examination.  I was the initial provider who evaluated this patient. I have signed out the follow up of any pending tests (i.e. labs, radiological studies) to the ACP.  I have communicated the patient’s plan of care and disposition with the ACP.

## 2021-11-10 NOTE — ED STATDOCS - CLINICAL SUMMARY MEDICAL DECISION MAKING FREE TEXT BOX
42 y/o M with PMH cholecystectomy and diverticulitis presents for epigastric abd pain x 4 days that started after drinking with nausea. On exam + epigastric and RUQ TTP as well as right CVAT. Will check labs, EKG, CT A/P, pain control, IV hydration and reassess.

## 2021-11-10 NOTE — CONSULT NOTE ADULT - ASSESSMENT
Patient is a 42yo M with PSH of lap cholecystectomy presenting with intermittent abdominal pain in RUQ. Images and labs reviewed, no leukocytosis or electrolyte imbalance, CT findings of calcification in GB fossa likely clip instrumentation from previous surgery    No surgical intervention indicated at the moment  Recommend PO challenge   Follow up outpatient with Gastroenterology for follow up for findings of liver cyst

## 2021-11-10 NOTE — ED STATDOCS - PROGRESS NOTE DETAILS
pt feeling well, tolerating PO as per surgery, PO challenge and f/u with GI denies cough or fever - will send antibx to pharmacy

## 2021-11-10 NOTE — CONSULT NOTE ADULT - SUBJECTIVE AND OBJECTIVE BOX
ACUTE CARE SURGERY CONSULT    HPI: Patient is a 40yo M with no significant PMH and PSH of lap cholecystectomy in 2017 presenting with abdominal pain since Saturday, reports pain was sudden onset in RUQ radiating to the back, is intermittent, and improves by lying down. Patient reports that episodes have happened once a day, unrelated to PO intake. Currently patient reports no pain, no nausea or emesis, having regular bowel function and tolerating diet. Denies fever, chills, chest pain, SOB, or generalized malaise.     PAST MEDICAL HISTORY:  Diverticula of intestine    PAST SURGICAL HISTORY:  History of appendectomy  History of cholecystectomy    ALLERGIES:  No Known Allergies      VITALS & I/Os:  Vital Signs Last 24 Hrs  T(C): 37.1 (10 Nov 2021 14:40), Max: 37.1 (10 Nov 2021 14:40)  T(F): 98.7 (10 Nov 2021 14:40), Max: 98.7 (10 Nov 2021 14:40)  HR: 92 (10 Nov 2021 14:40) (92 - 92)  BP: 134/84 (10 Nov 2021 14:40) (134/84 - 134/84)  BP(mean): --  RR: 18 (10 Nov 2021 14:40) (18 - 18)  SpO2: 97% (10 Nov 2021 14:40) (97% - 97%)  CAPILLARY BLOOD GLUCOSE          I&O's Summary      GENERAL: Alert, well developed, in no acute distress.  MENTAL STATUS: AAOx3. Appropriate affect.  HEENT: PERRLA. EOMI. MMM.  Trachea midline.   CHEST: non-labored breathing or conversational dyspnea  GASTROINTESTINAL: abdomen non-distended, soft and depressible, no-tender to palpation, negative paul sign, no rebound or guarding non peritonitic   MUSCULOSKELETAL: No cyanosis or clubbing. No gross deformities.       LABS:                        15.5   6.09  )-----------( 245      ( 10 Nov 2021 16:33 )             44.7     11-10    137  |  100  |  6.2<L>  ----------------------------<  95  3.5   |  23.0  |  0.68    Ca    9.1      10 Nov 2021 16:33    TPro  8.1  /  Alb  4.7  /  TBili  1.1  /  DBili  x   /  AST  18  /  ALT  15  /  AlkPhos  85  11-10    Lactate:        CARDIAC MARKERS ( 10 Nov 2021 16:33 )  x     / <0.01 ng/mL / x     / x     / x            Urinalysis Basic - ( 10 Nov 2021 17:03 )    Color: Yellow / Appearance: Clear / S.015 / pH: x  Gluc: x / Ketone: Large  / Bili: Negative / Urobili: Negative mg/dL   Blood: x / Protein: Negative mg/dL / Nitrite: Negative   Leuk Esterase: Negative / RBC: 0-2 /HPF / WBC 0-2   Sq Epi: x / Non Sq Epi: Occasional / Bacteria: Occasional        IMAGING:  CT Abdomen and Pelvis w/ Oral Cont and w/ IV Cont (11.10.21 @ 18:03)  FINDINGS:  LOWER CHEST: Stable focal nodular pleural thickening periphery right middle lobe. Nonspecific ill-defined opacity right lower lobe, possible focal infiltrate.    LIVER: Scattered hypodense foci hepatic parenchyma measuring up to 8 mm, too small to characterize, increased in number and size from prior evaluation.  BILE DUCTS: Normal caliber.  GALLBLADDER: Status post cholecystectomy. There is fluid and calcifications identified in the region of the gallbladder fossa which may represent a residual portion of the gallbladder and cystic duct, unchanged; correlate with surgical history.  SPLEEN: Stable 9 mm hypodense focus splenic parenchyma.  PANCREAS: Within normal limits.  ADRENALS:Within normal limits.  KIDNEYS/URETERS: No renal calculi. No space-occupying lesions. Minimal prominence bilateral intrarenal collecting systems. No ureteral calculi.    BLADDER: Within normal limits.  REPRODUCTIVE ORGANS: Prostate appears unremarkable.    BOWEL: No mechanical bowel obstruction. No colonic wall thickening. The appendix is not visualized.  PERITONEUM: No free intraperitoneal air or fluid.  VESSELS: Duplication IVC.  RETROPERITONEUM/LYMPH NODES: No lymphadenopathy.  ABDOMINAL WALL:Within normal limits.  BONES: Within normal limits.    IMPRESSION:  1. No evidence for mechanical bowel obstruction. No colonic wall thickening. No free intraperitoneal air or fluid.  2. Status post cholecystectomy. There is fluid and calcifications identified in the region of the gallbladder fossa which may represent a residual portion of the gallbladder and cystic duct, unchanged; correlate with surgical history.  3. Nonspecific ill-defined opacity right lower lobe, possible focal infiltrate.  4. Scattered hypodense foci hepatic parenchyma measuring up to 8 mm, too small to characterize, increased in number and size from prior evaluation. Contrast enhanced hepatic MRI can be performed for further assessment.

## 2021-11-10 NOTE — ED STATDOCS - CARE PROVIDER_API CALL
Waldemar Parra)  Gastroenterology; Internal Medicine  [address] [place] [address]  Phone: [phone]  Fax: [phone]  Follow Up Time:

## 2021-11-10 NOTE — ED STATDOCS - PATIENT PORTAL LINK FT
You can access the FollowMyHealth Patient Portal offered by [facility] by registering at the following website: [email or web address]. By joining [employer]’s FollowMyHealth portal, you will also be able to view your health information using other applications (apps) compatible with our system.

## 2021-11-10 NOTE — ED STATDOCS - OBJECTIVE STATEMENT
42 y/o M with PMH diverticulitis, lap kenneth and appendectomy presents for pressure-like abdominal pain x 4 days that started at night in the epigastric area. His pain today was worse with walking. It radiates from the lower chest area to the back. He notes nausea today, but no vomiting. Yesterday he had an episode of diarrhea that was non-bloody, but today his stool returned to normal. He denies fevers or chills, chest pain, shortness of breath, dysuria. He has been drinking a lot of fluids, eating minimally as he feels that the pain is minimally worse with eating. He had similar symptoms prior to his cholecystectomy, but none since and this is worse. He was given maalox at [facility] prior coming to the ED. He had Covid in Jan 2021 and is not vaccinated. He notes allergy a medication that he was given when he had diverticulitis (it made him feel sad, but he cannot recall the name). He smokes marijuana, denies cigarettes and illicit drugs. He drinks socially, last drink 4 nights ago. He drinks beer only and about 3-5 beers in a day.

## 2021-11-12 ENCOUNTER — EMERGENCY (EMERGENCY)
Facility: HOSPITAL | Age: 41
LOS: 1 days | Discharge: HOME | End: 2021-11-12
Attending: EMERGENCY MEDICINE
Payer: COMMERCIAL

## 2021-11-12 VITALS
DIASTOLIC BLOOD PRESSURE: 69 MMHG | TEMPERATURE: 99 F | HEIGHT: 67 IN | HEART RATE: 58 BPM | SYSTOLIC BLOOD PRESSURE: 130 MMHG | WEIGHT: 160.06 LBS | RESPIRATION RATE: 16 BRPM | OXYGEN SATURATION: 95 %

## 2021-11-12 VITALS
RESPIRATION RATE: 18 BRPM | SYSTOLIC BLOOD PRESSURE: 125 MMHG | TEMPERATURE: 98 F | OXYGEN SATURATION: 98 % | DIASTOLIC BLOOD PRESSURE: 84 MMHG | HEART RATE: 75 BPM

## 2021-11-12 DIAGNOSIS — Z90.49 ACQUIRED ABSENCE OF OTHER SPECIFIED PARTS OF DIGESTIVE TRACT: Chronic | ICD-10-CM

## 2021-11-12 LAB
ALBUMIN SERPL ELPH-MCNC: 4.4 G/DL — SIGNIFICANT CHANGE UP (ref 3.3–5.2)
ALP SERPL-CCNC: 76 U/L — SIGNIFICANT CHANGE UP (ref 40–120)
ALT FLD-CCNC: 12 U/L — SIGNIFICANT CHANGE UP
ANION GAP SERPL CALC-SCNC: 14 MMOL/L — SIGNIFICANT CHANGE UP (ref 5–17)
AST SERPL-CCNC: 19 U/L — SIGNIFICANT CHANGE UP
BASOPHILS # BLD AUTO: 0.01 K/UL — SIGNIFICANT CHANGE UP (ref 0–0.2)
BASOPHILS NFR BLD AUTO: 0.2 % — SIGNIFICANT CHANGE UP (ref 0–2)
BILIRUB SERPL-MCNC: 1.1 MG/DL — SIGNIFICANT CHANGE UP (ref 0.4–2)
BUN SERPL-MCNC: 8.9 MG/DL — SIGNIFICANT CHANGE UP (ref 8–20)
CALCIUM SERPL-MCNC: 8.6 MG/DL — SIGNIFICANT CHANGE UP (ref 8.6–10.2)
CHLORIDE SERPL-SCNC: 102 MMOL/L — SIGNIFICANT CHANGE UP (ref 98–107)
CO2 SERPL-SCNC: 20 MMOL/L — LOW (ref 22–29)
CREAT SERPL-MCNC: 0.66 MG/DL — SIGNIFICANT CHANGE UP (ref 0.5–1.3)
EOSINOPHIL # BLD AUTO: 0.03 K/UL — SIGNIFICANT CHANGE UP (ref 0–0.5)
EOSINOPHIL NFR BLD AUTO: 0.6 % — SIGNIFICANT CHANGE UP (ref 0–6)
GLUCOSE SERPL-MCNC: 93 MG/DL — SIGNIFICANT CHANGE UP (ref 70–99)
HCT VFR BLD CALC: 41.5 % — SIGNIFICANT CHANGE UP (ref 39–50)
HGB BLD-MCNC: 14.5 G/DL — SIGNIFICANT CHANGE UP (ref 13–17)
HIV 1 & 2 AB SERPL IA.RAPID: SIGNIFICANT CHANGE UP
IMM GRANULOCYTES NFR BLD AUTO: 0.2 % — SIGNIFICANT CHANGE UP (ref 0–1.5)
LIDOCAIN IGE QN: 17 U/L — LOW (ref 22–51)
LYMPHOCYTES # BLD AUTO: 1.36 K/UL — SIGNIFICANT CHANGE UP (ref 1–3.3)
LYMPHOCYTES # BLD AUTO: 26.1 % — SIGNIFICANT CHANGE UP (ref 13–44)
MCHC RBC-ENTMCNC: 31.7 PG — SIGNIFICANT CHANGE UP (ref 27–34)
MCHC RBC-ENTMCNC: 34.9 GM/DL — SIGNIFICANT CHANGE UP (ref 32–36)
MCV RBC AUTO: 90.8 FL — SIGNIFICANT CHANGE UP (ref 80–100)
MONOCYTES # BLD AUTO: 0.33 K/UL — SIGNIFICANT CHANGE UP (ref 0–0.9)
MONOCYTES NFR BLD AUTO: 6.3 % — SIGNIFICANT CHANGE UP (ref 2–14)
NEUTROPHILS # BLD AUTO: 3.47 K/UL — SIGNIFICANT CHANGE UP (ref 1.8–7.4)
NEUTROPHILS NFR BLD AUTO: 66.6 % — SIGNIFICANT CHANGE UP (ref 43–77)
PLATELET # BLD AUTO: 234 K/UL — SIGNIFICANT CHANGE UP (ref 150–400)
POTASSIUM SERPL-MCNC: 4 MMOL/L — SIGNIFICANT CHANGE UP (ref 3.5–5.3)
POTASSIUM SERPL-SCNC: 4 MMOL/L — SIGNIFICANT CHANGE UP (ref 3.5–5.3)
PROT SERPL-MCNC: 7.5 G/DL — SIGNIFICANT CHANGE UP (ref 6.6–8.7)
RBC # BLD: 4.57 M/UL — SIGNIFICANT CHANGE UP (ref 4.2–5.8)
RBC # FLD: 11.3 % — SIGNIFICANT CHANGE UP (ref 10.3–14.5)
SODIUM SERPL-SCNC: 136 MMOL/L — SIGNIFICANT CHANGE UP (ref 135–145)
WBC # BLD: 5.21 K/UL — SIGNIFICANT CHANGE UP (ref 3.8–10.5)
WBC # FLD AUTO: 5.21 K/UL — SIGNIFICANT CHANGE UP (ref 3.8–10.5)

## 2021-11-12 PROCEDURE — 85025 COMPLETE CBC W/AUTO DIFF WBC: CPT

## 2021-11-12 PROCEDURE — 99284 EMERGENCY DEPT VISIT MOD MDM: CPT | Mod: 25

## 2021-11-12 PROCEDURE — 76705 ECHO EXAM OF ABDOMEN: CPT

## 2021-11-12 PROCEDURE — 99284 EMERGENCY DEPT VISIT MOD MDM: CPT

## 2021-11-12 PROCEDURE — 76705 ECHO EXAM OF ABDOMEN: CPT | Mod: 26,RT

## 2021-11-12 PROCEDURE — 86703 HIV-1/HIV-2 1 RESULT ANTBDY: CPT

## 2021-11-12 PROCEDURE — 80053 COMPREHEN METABOLIC PANEL: CPT

## 2021-11-12 PROCEDURE — 83690 ASSAY OF LIPASE: CPT

## 2021-11-12 PROCEDURE — 36415 COLL VENOUS BLD VENIPUNCTURE: CPT

## 2021-11-12 RX ORDER — SUCRALFATE 1 G
1 TABLET ORAL ONCE
Refills: 0 | Status: COMPLETED | OUTPATIENT
Start: 2021-11-12 | End: 2021-11-12

## 2021-11-12 RX ORDER — FAMOTIDINE 10 MG/ML
20 INJECTION INTRAVENOUS DAILY
Refills: 0 | Status: DISCONTINUED | OUTPATIENT
Start: 2021-11-12 | End: 2021-11-16

## 2021-11-12 RX ORDER — ACETAMINOPHEN 500 MG
2 TABLET ORAL
Qty: 60 | Refills: 0
Start: 2021-11-12 | End: 2021-11-16

## 2021-11-12 RX ORDER — SUCRALFATE 1 G
1 TABLET ORAL
Qty: 120 | Refills: 0
Start: 2021-11-12 | End: 2021-12-11

## 2021-11-12 RX ORDER — OXYCODONE AND ACETAMINOPHEN 5; 325 MG/1; MG/1
1 TABLET ORAL ONCE
Refills: 0 | Status: DISCONTINUED | OUTPATIENT
Start: 2021-11-12 | End: 2021-11-12

## 2021-11-12 RX ADMIN — FAMOTIDINE 20 MILLIGRAM(S): 10 INJECTION INTRAVENOUS at 14:42

## 2021-11-12 RX ADMIN — OXYCODONE AND ACETAMINOPHEN 1 TABLET(S): 5; 325 TABLET ORAL at 18:29

## 2021-11-12 RX ADMIN — Medication 1 GRAM(S): at 14:42

## 2021-11-12 NOTE — ED PROVIDER NOTE - PROGRESS NOTE DETAILS
THERON: labs reviewed and unremarkable.  awaiting sono. HPI and intake orders and PE reviewed, patient requesting pain medication, will RX tylenol and carafate, patient has medical marijuana for nausea and pain, advised to follow up with GI

## 2021-11-12 NOTE — ED PROVIDER NOTE - PATIENT PORTAL LINK FT
You can access the FollowMyHealth Patient Portal offered by [facility] by registering at the following website: [email or web address]. By joining [employer]’s FollowMyHealth portal, you will also be able to view your health information using other applications (apps) compatible with our system.

## 2021-11-12 NOTE — ED PROVIDER NOTE - ATTENDING CONTRIBUTION TO CARE
intermittent cramping sensation in ruq radiating around to right mid back for the past 7 days.  varying intensity. no identifiable trigger, lasts several hours and resolves on its own.  Worse at night. No assoc vomiting. No fever. No URI symptoms.  + smoker, occ etoh (3-4 beers twice a week). Prior cholecystectomy, appendectomy and perf diverticulitis.  PE: nontoxic appearing, NAD, moist mms, no scleral icterus, moist mms, abd soft/ nondistended and dull to percussion, mild RUQ abd tenderness withOUT r/r/g, no cvat, no jaundice.   A/P poss choledocholithiasis vs PUD/ gastritis:  labs and sono.

## 2021-11-12 NOTE — ED PROVIDER NOTE - NSFOLLOWUPINSTRUCTIONS_ED_ALL_ED_FT
Abdominal Pain    Many things can cause abdominal pain. Many times, abdominal pain is not caused by a disease and will improve without treatment. Your health care provider will do a physical exam to determine if there is a dangerous cause of your pain; blood tests and imaging may help determine the cause of your pain. However, in many cases, no cause may be found and you may need further testing as an outpatient. Monitor your abdominal pain for any changes.     SEEK IMMEDIATE MEDICAL CARE IF YOU HAVE ANY OF THE FOLLOWING SYMPTOMS: worsening abdominal pain, uncontrollable vomiting, profuse diarrhea, inability to have bowel movements or pass gas, black or bloody stools, fever accompanying chest pain or back pain, or fainting. These symptoms may represent a serious problem that is an emergency. Do not wait to see if the symptoms will go away. Get medical help right away. Call 911 and do not drive yourself to the hospital.      Please follow up GI    Take medications as prescribed    Return if symptoms worsen or persist

## 2021-11-12 NOTE — ED ADULT TRIAGE NOTE - CHIEF COMPLAINT QUOTE
abdominal pain radiating to the back. "I was here recently for the same thing. they sent me home with medicine and to see a GI doctor. the medicine inst helping and doctor has no open appointments so im back."

## 2021-11-12 NOTE — ED PROVIDER NOTE - MUSCULOSKELETAL, MLM
Spine appears normal, range of motion is not limited, no muscle or joint tenderness Spine appears normal, range of motion is not limited, no joint tenderness. right middle back tenderness to palpation.

## 2021-11-12 NOTE — ED PROVIDER NOTE - OBJECTIVE STATEMENT
This is a 41 year old male with past medical history of diverticulitis, cholecystectomy, appendectomy and chronic back pain who presents with seven days of intermittent abdominal pain described as epigastric cramping that radiates along to his right back. His pain is no longer alleviated with heat and is worse when lying flat. He was seen in the emergency room Wednesday for similar symptoms and discharged after a CT with protonix and GI follow up. He states usually medical marijuana every other day for his chronic back pain and drinks 3-4 beers x2/week. He denies fevers, chills, nausea/vomiting, diarrhea and dysuria. This is a 41 year old male with past medical history of diverticulitis, cholecystectomy, appendectomy and chronic back pain who presents with seven days of intermittent abdominal pain described as epigastric cramping that radiates along to his right back. His pain is no longer alleviated with heat and is worse when lying flat. He was seen in the emergency room Wednesday for similar symptoms and discharged after a CT with Protonix and GI follow up. He states medical marijuana use every other day for his chronic back pain and drinks 3-4 beers x2/week. He denies fevers, chills, nausea/vomiting, diarrhea and dysuria. This is a 41 year old male with past medical history of diverticulitis, cholecystectomy, appendectomy and chronic back pain who presents with seven days of intermittent abdominal pain described as epigastric cramping that radiates along to his right back. His pain is no longer alleviated with heat and is worse when lying flat. He was seen in the emergency room Wednesday for similar symptoms and discharged after a CT with Protonix and GI follow up - unable to schedule GI appointment until January. He states medical marijuana use every other day for his chronic back pain and drinks 3-4 beers x2/week. He denies fevers, chills, nausea/vomiting, diarrhea and dysuria.

## 2021-11-12 NOTE — ED PROVIDER NOTE - CARE PROVIDER_API CALL
Dave Craft)  Gastroenterology; Internal Medicine  [address] [facility] [facility] [address]  [place] [address]  Phone: [phone]  Fax: [phone]  Follow Up Time:

## 2021-11-12 NOTE — ED PROVIDER NOTE - CLINICAL SUMMARY MEDICAL DECISION MAKING FREE TEXT BOX
This is a 41 year old male with past medical history of diverticulitis, cholecystectomy, appendectomy and chronic back pain who presents with seven days of intermittent abdominal pain describes as epigastric cramping that radiates along to his right back.     Will obtain CBC, CMP, lipase and abdominal ultrasound. Pepcid and sucralfate prescribe for epigastric pain and will reassess. This is a 41 year old male with past medical history of diverticulitis, cholecystectomy, appendectomy and chronic back pain who presents with seven days of intermittent abdominal pain describes as epigastric cramping that radiates along to his right back.   Will obtain CBC, CMP, lipase and abdominal ultrasound. Pepcid and sucralfate prescribe for epigastric pain and will reassess.

## 2021-11-23 ENCOUNTER — APPOINTMENT (OUTPATIENT)
Dept: GASTROENTEROLOGY | Facility: CLINIC | Age: 41
End: 2021-11-23
Payer: COMMERCIAL

## 2021-11-23 ENCOUNTER — TRANSCRIPTION ENCOUNTER (OUTPATIENT)
Age: 41
End: 2021-11-23

## 2021-11-23 VITALS
DIASTOLIC BLOOD PRESSURE: 80 MMHG | SYSTOLIC BLOOD PRESSURE: 125 MMHG | OXYGEN SATURATION: 98 % | HEART RATE: 73 BPM | TEMPERATURE: 98 F | BODY MASS INDEX: 25.11 KG/M2 | HEIGHT: 67 IN | RESPIRATION RATE: 16 BRPM | WEIGHT: 160 LBS

## 2021-11-23 DIAGNOSIS — Z09 ENCOUNTER FOR FOLLOW-UP EXAMINATION AFTER COMPLETED TREATMENT FOR CONDITIONS OTHER THAN MALIGNANT NEOPLASM: ICD-10-CM

## 2021-11-23 DIAGNOSIS — M54.9 DORSALGIA, UNSPECIFIED: ICD-10-CM

## 2021-11-23 DIAGNOSIS — K76.9 LIVER DISEASE, UNSPECIFIED: ICD-10-CM

## 2021-11-23 DIAGNOSIS — K21.9 GASTRO-ESOPHAGEAL REFLUX DISEASE W/OUT ESOPHAGITIS: ICD-10-CM

## 2021-11-23 DIAGNOSIS — R10.84 GENERALIZED ABDOMINAL PAIN: ICD-10-CM

## 2021-11-23 DIAGNOSIS — R10.13 EPIGASTRIC PAIN: ICD-10-CM

## 2021-11-23 PROCEDURE — 99204 OFFICE O/P NEW MOD 45 MIN: CPT

## 2021-11-23 PROCEDURE — 99214 OFFICE O/P EST MOD 30 MIN: CPT

## 2021-11-23 RX ORDER — SUCRALFATE 1 G/1
1 TABLET ORAL
Refills: 0 | Status: ACTIVE | COMMUNITY

## 2021-11-23 RX ORDER — PANTOPRAZOLE SODIUM 40 MG/1
40 GRANULE, DELAYED RELEASE ORAL
Refills: 0 | Status: DISCONTINUED | COMMUNITY
End: 2021-11-23

## 2021-11-23 RX ORDER — PANTOPRAZOLE 40 MG/1
40 TABLET, DELAYED RELEASE ORAL
Qty: 90 | Refills: 1 | Status: ACTIVE | COMMUNITY
Start: 2021-11-23 | End: 1900-01-01

## 2021-11-23 NOTE — HISTORY OF PRESENT ILLNESS
[de-identified] : \par Patient is a 41 year male, who is s/p appendectomy and s/p cholecystectomy many years ago, who presents for ER follow up after 2 ER visits for epigastric and RUQ pain. Patient had U/S RUQ which was essentially normal, went back to ER a few days later and CT a/p which showed scattered hypodense liver lesions, too small to characterize, recommended MRI for further characterization. Ct also showed normal bowel, s/p cholecystectomy with fluid collection in gallbladder fossa which can be residual gallbladder and cystic duct, unchanged, ill defined opacity RLL (was given zpack in ER). \par \par Pain started about 1 month ago, comes and goes but almost daily. Pt states he has intermittent nausea with pain, better without food but not particularly triggered when eating. He denies NSAID use, alcohol, caffeine intake. He smokes medicinal marijuana almost every night. He is a pescatarian. He was given Pantoprazole and Sucralfate in the ER and noticed relief after starting the two medications, particularly the Sucralfate. \par \par Patient denies any significant cardiac or pulmonary conditions. \par \par Patient denies pyrosis, dysphagia, change in BMs, rectal bleeding, nausea, vomiting, or unexplained weight loss.\par \par Patient has a f/u appt with PCP later today. \par \par \par EXAM:  CT ABDOMEN AND PELVIS OC IC                      \par \par PROCEDURE DATE:  11/10/2021  \par \par FINDINGS:\par LOWER CHEST: Stable focal nodular pleural thickening periphery right middle lobe. Nonspecific ill-defined opacity right lower lobe, possible focal infiltrate.\par LIVER: Scattered hypodense foci hepatic parenchyma measuring up to 8 mm, too small to characterize, increased in number and size from prior evaluation.\par BILE DUCTS: Normal caliber.\par GALLBLADDER: Status post cholecystectomy. There is fluid and calcifications identified in the region of the gallbladder fossa which may represent a residual portion of the gallbladder and cystic duct, unchanged; correlate with surgical history.\par SPLEEN: Stable 9 mm hypodense focus splenic parenchyma.\par PANCREAS: Within normal limits.\par ADRENALS: Within normal limits.\par KIDNEYS/URETERS: No renal calculi. No space-occupying lesions. Minimal prominence bilateral intrarenal collecting systems. No ureteral calculi.\par BLADDER: Within normal limits.\par REPRODUCTIVE ORGANS: Prostate appears unremarkable.\par BOWEL: No mechanical bowel obstruction. No colonic wall thickening. The appendix is not visualized.\par PERITONEUM: No free intraperitoneal air or fluid.\par VESSELS: Duplication IVC.\par RETROPERITONEUM/LYMPH NODES: No lymphadenopathy.\par ABDOMINAL WALL: Within normal limits.\par BONES: Within normal limits.\par \par IMPRESSION:\par 1. No evidence for mechanical bowel obstruction. No colonic wall thickening. No free intraperitoneal air or fluid.\par 2. Status post cholecystectomy. There is fluid and calcifications identified in the region of the gallbladder fossa which may represent a residual portion of the gallbladder and cystic duct, unchanged; correlate with surgical history.\par 3. Nonspecific ill-defined opacity right lower lobe, possible focal infiltrate.\par 4. Scattered hypodense foci hepatic parenchyma measuring up to 8 mm, too small to characterize, increased in number and size from prior evaluation. Contrast enhanced hepatic MRI can be performed for further assessment.\par \par \par --- End of Report ---\par \par \par YOJANA MENENDEZ MD; Attending Radiologist\par This document has been electronically signed. Nov 10 2021  6:24PM

## 2022-01-28 ENCOUNTER — TRANSCRIPTION ENCOUNTER (OUTPATIENT)
Age: 42
End: 2022-01-28

## 2022-01-31 ENCOUNTER — TRANSCRIPTION ENCOUNTER (OUTPATIENT)
Age: 42
End: 2022-01-31

## 2022-02-02 ENCOUNTER — APPOINTMENT (OUTPATIENT)
Dept: GASTROENTEROLOGY | Facility: GI CENTER | Age: 42
End: 2022-02-02

## 2022-03-10 ENCOUNTER — APPOINTMENT (OUTPATIENT)
Dept: GASTROENTEROLOGY | Facility: GI CENTER | Age: 42
End: 2022-03-10

## 2022-09-15 NOTE — ED PROVIDER NOTE - PRINCIPAL DIAGNOSIS
MA received voicemail from [facility] with [facility] asking if pt could schedule a colonoscopy with Dr. Alvino Mathis. MA attempted to return [facility] call. MA left detailed voicemail informing her that pt has not seen Dr. Alvino Mahtis in almost a year. After 6 months, pt is required to see provider again before scheduling procedures. MA contacted pt and scheduled consult with Dr. Alvino Mathis 9/19/22 @ 3pm. Pt verbalized understanding and accepted appt.     Electronically signed by Lindsay Patricia MA on 9/15/2022 at 10:07 AM
Abdominal pain

## 2025-04-08 NOTE — ASSESSMENT
[FreeTextEntry1] : Patient is a 41 year male, who is s/p appendectomy and s/p cholecystectomy many years ago, who presents for ER follow up after 2 ER visits for epigastric and RUQ pain. \par \par CT a/p in ER showed scattered small liver lesions, labs in ER normal\par Will order MRI and MRCP with and without contrast \par \par EGD to be scheduled to r/o gastritis, esophagitis, Hurley's Esophagus, or PUD. Indication, risks and benefit of EGD discussed with patient in detail. All questions answered. Patient is medically optimized for EGD.\par \par Continue Pantoprazole 40mg PO QD until EGD, will continue Sucralfate x 8 weeks 
Patient Representative